# Patient Record
Sex: FEMALE | URBAN - METROPOLITAN AREA
[De-identification: names, ages, dates, MRNs, and addresses within clinical notes are randomized per-mention and may not be internally consistent; named-entity substitution may affect disease eponyms.]

---

## 2021-09-27 ENCOUNTER — OFFICE VISIT (OUTPATIENT)
Dept: URBAN - METROPOLITAN AREA CLINIC 71 | Facility: CLINIC | Age: 51
End: 2021-09-27

## 2021-09-27 DIAGNOSIS — H52.223 REGULAR ASTIGMATISM, BILATERAL: ICD-10-CM

## 2021-09-27 DIAGNOSIS — H04.123 DRY EYE SYNDROME OF BILATERAL LACRIMAL GLANDS: Primary | ICD-10-CM

## 2021-09-27 PROCEDURE — 99202 OFFICE O/P NEW SF 15 MIN: CPT | Performed by: OPTOMETRIST

## 2021-09-27 ASSESSMENT — INTRAOCULAR PRESSURE
OS: 12
OD: 12

## 2021-09-27 NOTE — IMPRESSION/PLAN
Impression: Regular astigmatism, bilateral: H52.223. Blurred vision OU Plan: Discussed. A glasses Rx was NOT generated or given to pt today. Vision was improved looking through [address] [place] today. Recommend coming back for refraction.   Okay to schedule refraction at pt request.

## 2021-09-27 NOTE — IMPRESSION/PLAN
Impression: Dry eye syndrome of bilateral lacrimal glands: H04.123. Pt reports fbs and irritation/burning. Plan: Discussed dry eye disease. Use artificial tears up to QID. Recommend Systane Complete. Sample given to pt today. Call if worsens or no improvement.